# Patient Record
Sex: MALE | Race: WHITE | Employment: FULL TIME | URBAN - METROPOLITAN AREA
[De-identification: names, ages, dates, MRNs, and addresses within clinical notes are randomized per-mention and may not be internally consistent; named-entity substitution may affect disease eponyms.]

---

## 2018-09-16 ENCOUNTER — HOSPITAL ENCOUNTER (EMERGENCY)
Age: 38
Discharge: HOME | End: 2018-09-16
Attending: EMERGENCY MEDICINE
Payer: MEDICARE

## 2018-09-16 VITALS
TEMPERATURE: 98.5 F | WEIGHT: 170 LBS | HEART RATE: 112 BPM | RESPIRATION RATE: 15 BRPM | DIASTOLIC BLOOD PRESSURE: 94 MMHG | OXYGEN SATURATION: 95 % | SYSTOLIC BLOOD PRESSURE: 138 MMHG

## 2018-09-16 DIAGNOSIS — Z00.8 MEDICAL CLEARANCE FOR INCARCERATION: Primary | ICD-10-CM

## 2018-09-16 LAB
EKG ATRIAL RATE: 113 BPM
EKG P AXIS: 47 DEGREES
EKG P-R INTERVAL: 124 MS
EKG Q-T INTERVAL: 320 MS
EKG QRS DURATION: 82 MS
EKG QTC CALCULATION (BAZETT): 438 MS
EKG R AXIS: 57 DEGREES
EKG T AXIS: 47 DEGREES
EKG VENTRICULAR RATE: 113 BPM

## 2018-09-16 PROCEDURE — 90471 IMMUNIZATION ADMIN: CPT | Performed by: EMERGENCY MEDICINE

## 2018-09-16 PROCEDURE — 6360000002 HC RX W HCPCS: Performed by: EMERGENCY MEDICINE

## 2018-09-16 PROCEDURE — 99283 EMERGENCY DEPT VISIT LOW MDM: CPT

## 2018-09-16 PROCEDURE — 90715 TDAP VACCINE 7 YRS/> IM: CPT | Performed by: EMERGENCY MEDICINE

## 2018-09-16 PROCEDURE — 93005 ELECTROCARDIOGRAM TRACING: CPT

## 2018-09-16 RX ADMIN — TETANUS TOXOID, REDUCED DIPHTHERIA TOXOID AND ACELLULAR PERTUSSIS VACCINE, ADSORBED 0.5 ML: 5; 2.5; 8; 8; 2.5 SUSPENSION INTRAMUSCULAR at 22:24

## 2018-09-17 ASSESSMENT — ENCOUNTER SYMPTOMS
RHINORRHEA: 0
VOMITING: 0
COLOR CHANGE: 0
SHORTNESS OF BREATH: 0
ABDOMINAL PAIN: 0
COUGH: 0
EYE PAIN: 0
NAUSEA: 0
SORE THROAT: 0
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
## 2022-02-07 ENCOUNTER — APPOINTMENT (OUTPATIENT)
Dept: GENERAL RADIOLOGY | Age: 42
End: 2022-02-07
Payer: MEDICARE

## 2022-02-07 ENCOUNTER — HOSPITAL ENCOUNTER (EMERGENCY)
Age: 42
Discharge: HOME | End: 2022-02-07
Attending: EMERGENCY MEDICINE
Payer: MEDICARE

## 2022-02-07 VITALS
DIASTOLIC BLOOD PRESSURE: 88 MMHG | HEIGHT: 69 IN | SYSTOLIC BLOOD PRESSURE: 114 MMHG | HEART RATE: 74 BPM | TEMPERATURE: 97.2 F | BODY MASS INDEX: 23.7 KG/M2 | RESPIRATION RATE: 18 BRPM | WEIGHT: 160 LBS | OXYGEN SATURATION: 98 %
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
## 2022-02-07 DIAGNOSIS — S46.911A STRAIN OF RIGHT SHOULDER, INITIAL ENCOUNTER: ICD-10-CM

## 2022-02-07 DIAGNOSIS — S23.9XXA THORACIC SPRAIN: ICD-10-CM

## 2022-02-07 PROCEDURE — 73030 X-RAY EXAM OF SHOULDER: CPT

## 2022-02-07 PROCEDURE — 99285 EMERGENCY DEPT VISIT HI MDM: CPT

## 2022-02-07 PROCEDURE — 72072 X-RAY EXAM THORAC SPINE 3VWS: CPT

## 2022-02-07 RX ORDER — CYCLOBENZAPRINE HCL 10 MG
10 TABLET ORAL NIGHTLY PRN
Qty: 10 TABLET | Refills: 0 | Status: SHIPPED | OUTPATIENT
Start: 2022-02-07

## 2022-02-07 ASSESSMENT — PAIN DESCRIPTION - PAIN TYPE: TYPE: ACUTE PAIN

## 2022-02-07 ASSESSMENT — PAIN DESCRIPTION - LOCATION: LOCATION: SHOULDER

## 2022-02-07 ASSESSMENT — PAIN DESCRIPTION - FREQUENCY: FREQUENCY: CONTINUOUS

## 2022-02-07 ASSESSMENT — PAIN SCALES - GENERAL: PAINLEVEL_OUTOF10: 9

## 2022-02-07 ASSESSMENT — PAIN DESCRIPTION - DESCRIPTORS: DESCRIPTORS: DISCOMFORT

## 2022-02-07 ASSESSMENT — PAIN DESCRIPTION - ORIENTATION: ORIENTATION: RIGHT
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
## 2022-02-07 NOTE — Clinical Note
Ashley Garcia was seen and treated in our emergency department on 2/7/2022. He may return to work on 02/08/2022. If you have any questions or concerns, please don't hesitate to call.       Ernst Cuevas PA-C

## 2022-02-07 NOTE — ED NOTES
Discharge instructions reviewed with patient, noting prescription for Flexeril and need to set-up appointment with family provider for follow-up. Patient gathered all personal items and transferred off unit to lobby under own power without incident.       Gricel Acuna RN  02/07/22 0623

## 2023-03-06 ENCOUNTER — HOSPITAL ENCOUNTER (EMERGENCY)
Age: 43
Discharge: HOME | End: 2023-03-06
Attending: STUDENT IN AN ORGANIZED HEALTH CARE EDUCATION/TRAINING PROGRAM

## 2023-03-06 VITALS
HEIGHT: 69 IN | DIASTOLIC BLOOD PRESSURE: 74 MMHG | SYSTOLIC BLOOD PRESSURE: 111 MMHG | RESPIRATION RATE: 15 BRPM | BODY MASS INDEX: 23.11 KG/M2 | HEART RATE: 90 BPM | OXYGEN SATURATION: 96 % | TEMPERATURE: 98.7 F | WEIGHT: 156 LBS

## 2023-03-06 DIAGNOSIS — U07.1 COVID-19: Primary | ICD-10-CM

## 2023-03-06 LAB
FLUAV RNA RESP QL NAA+PROBE: NOT DETECTED
FLUBV RNA RESP QL NAA+PROBE: NOT DETECTED
SARS-COV-2 RNA RESP QL NAA+PROBE: DETECTED
SOURCE: ABNORMAL
SPECIMEN DESCRIPTION: ABNORMAL

## 2023-03-06 PROCEDURE — 99283 EMERGENCY DEPT VISIT LOW MDM: CPT

## 2023-03-06 PROCEDURE — 87636 SARSCOV2 & INF A&B AMP PRB: CPT

## 2023-03-06 RX ORDER — IBUPROFEN 600 MG/1
600 TABLET ORAL EVERY 8 HOURS PRN
Qty: 15 TABLET | Refills: 0 | Status: SHIPPED | OUTPATIENT
Start: 2023-03-06 | End: 2023-03-11

## 2023-03-06 RX ORDER — ACETAMINOPHEN 500 MG
1000 TABLET ORAL 3 TIMES DAILY
Qty: 42 TABLET | Refills: 0 | Status: SHIPPED | OUTPATIENT
Start: 2023-03-06 | End: 2023-03-13

## 2023-03-06 ASSESSMENT — ENCOUNTER SYMPTOMS
ABDOMINAL PAIN: 0
COUGH: 0
SHORTNESS OF BREATH: 0
NAUSEA: 0
RHINORRHEA: 0
VOMITING: 0

## 2023-03-06 ASSESSMENT — LIFESTYLE VARIABLES
HOW OFTEN DO YOU HAVE A DRINK CONTAINING ALCOHOL: NEVER
HOW MANY STANDARD DRINKS CONTAINING ALCOHOL DO YOU HAVE ON A TYPICAL DAY: PATIENT DOES NOT DRINK

## 2023-03-06 NOTE — ED PROVIDER NOTES
[facility]  Emergency Department Encounter  Emergency Medicine Physician     Pt Name: Khushi Dejesus  MRN: [id number]  [place] 1980  Date of evaluation: 3/6/23  PCP:  No primary care provider on file. CHIEF COMPLAINT       Chief Complaint   Patient presents with    Cough    Nasal Congestion       HISTORY OF PRESENT ILLNESS  (Location/Symptom, Timing/Onset, Context/Setting, Quality, Duration, Modifying Factors, Severity.)    Khushi Dejesus is a 38 y.o. male who presents with chills, body aches, cough. Symptoms worsening over the past few days. He states his girlfriend has similar symptoms. He states that he did not sleep very well last night. He states he has been taking Motrin along with Sharif back and body. Denies any nausea or vomiting. Was able to eat [employer] this morning although he stated that \"had no flavor\". Denies any abdominal pain chest pain or shortness of breath. PAST MEDICAL / SURGICAL / SOCIAL / FAMILY HISTORY    has no past medical history on file. has a past surgical history that includes Testicle surgery.     Social History     Socioeconomic History    Marital status:      Spouse name: Not on file    Number of children: Not on file    Years of education: Not on file    Highest education level: Not on file   Occupational History    Not on file   Tobacco Use    Smoking status: Every Day     Packs/day: 1.00     Types: Cigarettes    Smokeless tobacco: Never   Substance and Sexual Activity    Alcohol use: No    Drug use: No    Sexual activity: Not on file   Other Topics Concern    Not on file   Social History Narrative    Not on file     Social Determinants of Health     Financial Resource Strain: Not on file   Food Insecurity: Not on file   Transportation Needs: Not on file   Physical Activity: Not on file   Stress: Not on file   Social Connections: Not on file   Intimate Partner Violence: Not on file   Housing Stability: Not on file       No family history on file. Allergies:    Pcn [penicillins]    Home Medications:  Prior to Admission medications    Medication Sig Start Date End Date Taking? Authorizing Provider   acetaminophen (TYLENOL) 500 MG tablet Take 2 tablets by mouth 3 times daily for 7 days 3/6/23 3/13/23 Yes Jose Silva DO   ibuprofen (IBU) 600 MG tablet Take 1 tablet by mouth every 8 hours as needed for Pain 3/6/23 3/11/23 Yes Jose Silva DO       REVIEW OF SYSTEMS    (2-9 systems for level 4, 10 or more for level 5)    Review of Systems   Constitutional:  Positive for chills and fatigue. Negative for fever. HENT:  Negative for congestion and rhinorrhea. Respiratory:  Negative for cough and shortness of breath. Cardiovascular:  Negative for chest pain. Gastrointestinal:  Negative for abdominal pain, nausea and vomiting. Musculoskeletal:  Positive for myalgias. Neurological:  Negative for light-headedness and headaches. All other systems reviewed and are negative. PHYSICAL EXAM   (up to 7 for level 4, 8 or more for level 5)    INITIAL VITALS:   ED Triage Vitals [03/06/23 0641]   BP Temp Temp Source Heart Rate Resp SpO2 Height Weight   105/77 98.7 °F (37.1 °C) Oral 87 20 98 % 5' 9\" (1.753 m) 156 lb (70.8 kg)       Physical Exam  Vitals and nursing note reviewed. Constitutional:       General: He is not in acute distress. Appearance: Normal appearance. Cardiovascular:      Rate and Rhythm: Normal rate and regular rhythm. Pulses: Normal pulses. Radial pulses are 2+ on the right side and 2+ on the left side. Heart sounds: Normal heart sounds. No murmur heard. Pulmonary:      Effort: Pulmonary effort is normal. No respiratory distress. Breath sounds: Normal breath sounds. No decreased breath sounds, wheezing or rhonchi. Abdominal:      Palpations: Abdomen is soft. Tenderness: There is no abdominal tenderness. There is left CVA tenderness. There is no right CVA tenderness. Skin:     General: Skin is warm and dry. Capillary Refill: Capillary refill takes less than 2 seconds. Neurological:      General: No focal deficit present. Mental Status: He is alert and oriented to person, place, and time. DIFFERENTIAL  DIAGNOSIS   PLAN (LABS / IMAGING / EKG):  Orders Placed This Encounter   Procedures    COVID-19 & Influenza Combo       MEDICATIONS ORDERED:  Orders Placed This Encounter   Medications    acetaminophen (TYLENOL) 500 MG tablet     Sig: Take 2 tablets by mouth 3 times daily for 7 days     Dispense:  42 tablet     Refill:  0    ibuprofen (IBU) 600 MG tablet     Sig: Take 1 tablet by mouth every 8 hours as needed for Pain     Dispense:  15 tablet     Refill:  0         DIAGNOSTIC RESULTS / EMERGENCYDEPARTMENT COURSE / MDM   LABS:  Labs Reviewed   COVID-19 & INFLUENZA COMBO - Abnormal; Notable for the following components:       Result Value    SARS-CoV-2 RNA, RT PCR DETECTED (*)     All other components within normal limits       RADIOLOGY:  No results found. Impression:  1)  Number and Complexity of Problems    Problem List, DDX, Considered diagnosis:  COVID, influenza, viral syndrome    High concern for COVID, will swab for COVID and influenza. Mild symptoms otherwise, vital signs stable. Pertinent Comorbid Conditions:  See history above. EMERGENCY DEPARTMENT COURSE:  MDM:    2)  Data Reviewed    Decision Rules, Testing considered, external document review, independent imaging review:    Consider chest x-ray but with mild symptoms, I do not feel it is necessary at this time.   Patient took [place] just before arrival.  The bare he takes is a mixture of aspirin and acetaminophen      3)  Treatment and Disposition     ED Course as of 03/06/23 0817   Mon Mar 06, 2023   0731 SARS-CoV-2 RNA, RT PCR(!): DETECTED [AP]      ED Course User Index  [AP] Taurus Bautista, DO       Patient repeat assessment, shared decision making, and disposition discussion: Reassessment patient feels about the same. He is COVID-positive. Will plan for isolation precautions, symptomatic treatment, and work note. Given the fact the patient is having relatively mild symptoms I do not feel that he is a candidate for Paxlovid therapy    MIPS:  [None]    Social determinants of health impacting treatment or disposition: Does not have a primary care provider but can fill prescriptions    Code Status Discussion:  Did not have Code status discussion since patient being discharged      PROCEDURES:  None    CONSULTS:  None    CRITICAL CARE:  There was a high probability of clinically significant/life threatening deterioration in this patient's condition which required my urgent intervention. Total critical care time was 10 minutes. This excludes any time for separately reportable procedures. FINAL IMPRESSION     1. COVID-19          DISPOSITION / PLAN   DISPOSITION Decision To Discharge 03/06/2023 07:33:13 AM      Evaluation and treatment course in the ED, and plan of care upon discharge was discussed in length with the patient/patient representative. Patient/patient representative had no further questions prior to being discharged and was instructed to return to the ED for new or worsening symptoms. Any changes to existing medications or new prescriptions were reviewed with patient/patient representative and they expressed understanding of how to correctly take their medications and the possible side effects.     PATIENT REFERRED TO:  JIM CAMP [facility] [address]  [address] [place] [address] [address] [address]  Schedule an appointment as soon as possible for a visit   To establish care    DISCHARGE MEDICATIONS:  Discharge Medication List as of 3/6/2023  7:37 AM        START taking these medications    Details   acetaminophen (TYLENOL) 500 MG tablet Take 2 tablets by mouth 3 times daily for 7 days, Disp-42 tablet, R-0Print      ibuprofen (IBU) 600 MG tablet Take 1 tablet by mouth every 8 hours as needed for Pain, Disp-15 tablet, R-0Print             Beth Daniels DO  Emergency Medicine Attending    (Please note that portions of this note were completed with a voice recognition program.  Efforts were made to edit the dictations but occasionally words are mis-transcribed.)          Beth Daniels DO  03/06/23 0818

## 2023-03-06 NOTE — DISCHARGE INSTRUCTIONS
Today tested positive for COVID-19  I would recommend continue to isolate yourself for at least the next 5 days.   I recommend wearing a mask at all times unless you are by yourself or outside  Please try to limit the amount of time you spend with other people  You may take the Tylenol and Motrin as needed as prescribed  Should you begin having any shortness of breath, high fevers, chills, difficulty breathing, or any other severe concerning symptoms or unresolving symptoms, please return to emergency room immediately Statement Selected

## 2023-03-06 NOTE — ED TRIAGE NOTES
Mode of arrival (squad #, walk in, police, etc) : walk In      Chief complaint(s): Cough , Sob . Runny Nose     Arrival Note (brief scenario, treatment PTA, etc). : came with complains of  cough , runny nose and  feeling hot and warm since last  night. ,  Pt think he might have COVID . C= \"Have you ever felt that you should Cut down on your drinking? \"  No  A= \"Have people Annoyed you by criticizing your drinking? \"  No  G= \"Have you ever felt bad or Guilty about your drinking? \"  No  E= \"Have you ever had a drink as an Eye-opener first thing in the morning to steady your nerves or to help a hangover? \"  No      Deferred []    Reason for deferring: N/A    *If yes to two or more: probable alcohol abuse. *

## 2023-03-06 NOTE — Clinical Note
Nahum Rock was seen and treated in our emergency department on 3/6/2023. He may return to work on 03/13/2023. Due to COVID-19 infection, patient safe to return to work on 3/13/2023     If you have any questions or concerns, please don't hesitate to call.       Kaela Lane, DO

## 2024-03-23 ENCOUNTER — HOSPITAL ENCOUNTER (EMERGENCY)
Age: 44
Discharge: HOME | End: 2024-03-23
Attending: EMERGENCY MEDICINE

## 2024-03-23 ENCOUNTER — APPOINTMENT (OUTPATIENT)
Dept: CT IMAGING | Age: 44
End: 2024-03-23

## 2024-03-23 ENCOUNTER — APPOINTMENT (OUTPATIENT)
Dept: GENERAL RADIOLOGY | Age: 44
End: 2024-03-23
Attending: EMERGENCY MEDICINE

## 2024-03-23 VITALS
BODY MASS INDEX: 24.44 KG/M2 | TEMPERATURE: 98.4 F | HEIGHT: 69 IN | WEIGHT: 165 LBS | SYSTOLIC BLOOD PRESSURE: 114 MMHG | DIASTOLIC BLOOD PRESSURE: 87 MMHG | HEART RATE: 95 BPM

## 2024-03-23 DIAGNOSIS — J06.9 UPPER RESPIRATORY TRACT INFECTION, UNSPECIFIED TYPE: Primary | ICD-10-CM

## 2024-03-23 DIAGNOSIS — H53.40 VISUAL FIELD DEFECTS: ICD-10-CM

## 2024-03-23 LAB
ANION GAP SERPL CALCULATED.3IONS-SCNC: 10 MMOL/L (ref 9–17)
BASOPHILS # BLD: 0.1 K/UL (ref 0–0.2)
BASOPHILS NFR BLD: 1 % (ref 0–2)
BUN SERPL-MCNC: 15 MG/DL (ref 6–20)
CALCIUM SERPL-MCNC: 9 MG/DL (ref 8.6–10.4)
CHLORIDE SERPL-SCNC: 105 MMOL/L (ref 98–107)
CK SERPL-CCNC: 107 U/L (ref 39–308)
CO2 SERPL-SCNC: 24 MMOL/L (ref 20–31)
CREAT SERPL-MCNC: 0.8 MG/DL (ref 0.7–1.2)
EOSINOPHIL # BLD: 0.3 K/UL (ref 0–0.4)
EOSINOPHILS RELATIVE PERCENT: 2 % (ref 0–4)
ERYTHROCYTE [DISTWIDTH] IN BLOOD BY AUTOMATED COUNT: 13.9 % (ref 11.5–14.9)
GFR SERPL CREATININE-BSD FRML MDRD: >60 ML/MIN/1.73M2
GLUCOSE SERPL-MCNC: 88 MG/DL (ref 70–99)
HCT VFR BLD AUTO: 45.4 % (ref 41–53)
HGB BLD-MCNC: 15.2 G/DL (ref 13.5–17.5)
LYMPHOCYTES NFR BLD: 2.2 K/UL (ref 1–4.8)
LYMPHOCYTES RELATIVE PERCENT: 20 % (ref 24–44)
MAGNESIUM SERPL-MCNC: 2.2 MG/DL (ref 1.6–2.6)
MCH RBC QN AUTO: 32.7 PG (ref 26–34)
MCHC RBC AUTO-ENTMCNC: 33.5 G/DL (ref 31–37)
MCV RBC AUTO: 97.7 FL (ref 80–100)
MONOCYTES NFR BLD: 0.7 K/UL (ref 0.1–1.3)
MONOCYTES NFR BLD: 7 % (ref 1–7)
NEUTROPHILS NFR BLD: 70 % (ref 36–66)
NEUTS SEG NFR BLD: 7.5 K/UL (ref 1.3–9.1)
PLATELET # BLD AUTO: 313 K/UL (ref 150–450)
PMV BLD AUTO: 8 FL (ref 6–12)
POTASSIUM SERPL-SCNC: 4.2 MMOL/L (ref 3.7–5.3)
RBC # BLD AUTO: 4.65 M/UL (ref 4.5–5.9)
SODIUM SERPL-SCNC: 139 MMOL/L (ref 135–144)
WBC OTHER # BLD: 10.7 K/UL (ref 3.5–11)

## 2024-03-23 PROCEDURE — 85025 COMPLETE CBC W/AUTO DIFF WBC: CPT

## 2024-03-23 PROCEDURE — 2580000003 HC RX 258: Performed by: EMERGENCY MEDICINE

## 2024-03-23 PROCEDURE — 70450 CT HEAD/BRAIN W/O DYE: CPT

## 2024-03-23 PROCEDURE — 83735 ASSAY OF MAGNESIUM: CPT

## 2024-03-23 PROCEDURE — 71045 X-RAY EXAM CHEST 1 VIEW: CPT

## 2024-03-23 PROCEDURE — 82550 ASSAY OF CK (CPK): CPT

## 2024-03-23 PROCEDURE — 36415 COLL VENOUS BLD VENIPUNCTURE: CPT

## 2024-03-23 PROCEDURE — 99284 EMERGENCY DEPT VISIT MOD MDM: CPT

## 2024-03-23 PROCEDURE — 80048 BASIC METABOLIC PNL TOTAL CA: CPT

## 2024-03-23 RX ORDER — 0.9 % SODIUM CHLORIDE 0.9 %
1000 INTRAVENOUS SOLUTION INTRAVENOUS ONCE
Status: COMPLETED | OUTPATIENT
Start: 2024-03-23 | End: 2024-03-23

## 2024-03-23 RX ORDER — BENZONATATE 100 MG/1
100 CAPSULE ORAL 3 TIMES DAILY PRN
Qty: 20 CAPSULE | Refills: 0 | Status: SHIPPED | OUTPATIENT
Start: 2024-03-23 | End: 2024-03-30

## 2024-03-23 RX ADMIN — SODIUM CHLORIDE 1000 ML: 9 INJECTION, SOLUTION INTRAVENOUS at 14:10

## 2024-03-23 ASSESSMENT — ENCOUNTER SYMPTOMS
FACIAL SWELLING: 0
COLOR CHANGE: 0
VOMITING: 0
EYE PAIN: 0
SINUS PRESSURE: 0
CONSTIPATION: 0
RHINORRHEA: 0
CHEST TIGHTNESS: 0
EYE DISCHARGE: 0
BACK PAIN: 0
BLOOD IN STOOL: 0
COUGH: 1
EYE REDNESS: 0
SORE THROAT: 0
SHORTNESS OF BREATH: 0
WHEEZING: 0
TROUBLE SWALLOWING: 0
DIARRHEA: 0
ABDOMINAL PAIN: 0

## 2024-03-23 ASSESSMENT — PAIN - FUNCTIONAL ASSESSMENT: PAIN_FUNCTIONAL_ASSESSMENT: 0-10

## 2024-03-23 ASSESSMENT — PAIN SCALES - GENERAL: PAINLEVEL_OUTOF10: 4

## 2024-03-23 ASSESSMENT — PAIN DESCRIPTION - LOCATION: LOCATION: GENERALIZED

## 2024-03-23 ASSESSMENT — LIFESTYLE VARIABLES
HOW MANY STANDARD DRINKS CONTAINING ALCOHOL DO YOU HAVE ON A TYPICAL DAY: PATIENT DOES NOT DRINK
HOW OFTEN DO YOU HAVE A DRINK CONTAINING ALCOHOL: NEVER

## 2024-03-23 NOTE — ED TRIAGE NOTES
Mode of arrival (squad #, walk in, police, etc) : walk-in        Chief complaint(s): headache, cough, body aches,, gait problem        Arrival Note (brief scenario, treatment PTA, etc).: patient states for about a week and half, gait/vision problem, headache, cough, nasal congestion and bodyaches         C= \"Have you ever felt that you should Cut down on your drinking?\"  No  A= \"Have people Annoyed you by criticizing your drinking?\"  No  G= \"Have you ever felt bad or Guilty about your drinking?\"  No  E= \"Have you ever had a drink as an Eye-opener first thing in the morning to steady your nerves or to help a hangover?\"  No      Deferred []      Reason for deferring: N/A    *If yes to two or more: probable alcohol abuse.*

## 2024-03-23 NOTE — ED PROVIDER NOTES
[facility]  eMERGENCY dEPARTMENT eNCOUnter      Pt Name: Philippe Aggarwal  MRN: [id number]  Birthdate 1980  Date of evaluation: 3/23/24      CHIEF COMPLAINT       Chief Complaint   Patient presents with    Cough    Nasal Congestion    Generalized Body Aches    Headache    Gait Problem         HISTORY OF PRESENT ILLNESS    Philippe Aggarwal is a 43 y.o. male who presents complaining of multiple complaints.  Patient states about 2-1/2 weeks ago he started having a cough nonproductive sputum with a lot of congestion and just body aches.  Patient states his left eye he started having issues with vision.  Patient states that most the time he can see it what he is looking at but then he has a lot of peripheral vision and blindness.  Patient states that he gets blurry and then just loses things.  Patient states sometimes even the central vision gets really blurry when he is looking.  Patient states he gets real dizzy when he stands up and sometimes when he is walking he cannot veers to the left according to his significant other.  Patient has no trauma to the head.  Patient has no numbness tingling or weakness anywhere else.  Patient has had no speech issues.  Patient denies vomiting or diarrhea.      REVIEW OF SYSTEMS       Review of Systems   Constitutional:  Negative for activity change, appetite change, chills, diaphoresis and fever.   HENT:  Negative for congestion, ear pain, facial swelling, nosebleeds, rhinorrhea, sinus pressure, sore throat and trouble swallowing.    Eyes:  Positive for visual disturbance. Negative for pain, discharge and redness.   Respiratory:  Positive for cough. Negative for chest tightness, shortness of breath and wheezing.    Cardiovascular:  Negative for chest pain, palpitations and leg swelling.   Gastrointestinal:  Negative for abdominal pain, blood in stool, constipation, diarrhea, nausea and vomiting.   Genitourinary:  Negative for difficulty urinating, dysuria, flank pain, frequency, genital  03/23/24 1519   Sat Mar 23, 2024   1517 Discussed the case with Dr. Mayfield ophthalmologist who feels the patient will need a more thorough retinal exam and recommends following up with them first thing Monday morning.  He also recommends no physical activity to where he would be jostling his head around or potentially getting hit in the head.  Patient was updated on this and we will give him follow-up information. [JL]      ED Course User Index  [JL] Roddy Caraballo MD         PROCEDURES:  None      DATA FOR LAB AND RADIOLOGY TESTS ORDERED BELOW ARE REVIEWED BY THE ED CLINICIAN:    RADIOLOGY: All x-rays, CT, MRI, and formal ultrasound images (except ED bedside ultrasound) are read by the radiologist, see reports below, unless otherwise noted in MDM or here.  Reports below are reviewed by myself.  CT HEAD WO CONTRAST   Final Result   No acute intracranial abnormalities are noted.         XR CHEST PORTABLE   Final Result   No significant abnormalities detected.             LABS: Lab orders shown below, the results are reviewed by myself, and all abnormals are listed below.  Labs Reviewed   CBC WITH AUTO DIFFERENTIAL - Abnormal; Notable for the following components:       Result Value    Neutrophils % 70 (*)     Lymphocytes % 20 (*)     All other components within normal limits   BASIC METABOLIC PANEL   MAGNESIUM   CK       Vitals Reviewed:    Vitals:    03/23/24 1342   BP: 114/87   Pulse: 95   Temp: 98.4 °F (36.9 °C)   TempSrc: Oral   Weight: 74.8 kg (165 lb)   Height: 1.753 m (5' 9\")     MEDICATIONS GIVEN TO PATIENT THIS ENCOUNTER:  Orders Placed This Encounter   Medications    sodium chloride 0.9 % bolus 1,000 mL    benzonatate (TESSALON) 100 MG capsule     Sig: Take 1 capsule by mouth 3 times daily as needed for Cough     Dispense:  20 capsule     Refill:  0     DISCHARGE PRESCRIPTIONS:  New Prescriptions    BENZONATATE (TESSALON) 100 MG CAPSULE    Take 1 capsule by mouth 3 times daily as needed for Cough

## 2024-09-10 ENCOUNTER — HOSPITAL ENCOUNTER (EMERGENCY)
Age: 44
Discharge: HOME | End: 2024-09-10
Attending: EMERGENCY MEDICINE

## 2024-09-10 VITALS
BODY MASS INDEX: 23.64 KG/M2 | HEIGHT: 68 IN | HEART RATE: 99 BPM | RESPIRATION RATE: 17 BRPM | DIASTOLIC BLOOD PRESSURE: 96 MMHG | WEIGHT: 156 LBS | TEMPERATURE: 98.6 F | OXYGEN SATURATION: 97 % | SYSTOLIC BLOOD PRESSURE: 114 MMHG

## 2024-09-10 DIAGNOSIS — J06.9 VIRAL UPPER RESPIRATORY TRACT INFECTION: Primary | ICD-10-CM

## 2024-09-10 LAB
FLUAV RNA RESP QL NAA+PROBE: NOT DETECTED
FLUBV RNA RESP QL NAA+PROBE: NOT DETECTED
SARS-COV-2 RNA RESP QL NAA+PROBE: NOT DETECTED
SOURCE: NORMAL
SPECIMEN DESCRIPTION: NORMAL

## 2024-09-10 PROCEDURE — 99283 EMERGENCY DEPT VISIT LOW MDM: CPT

## 2024-09-10 PROCEDURE — 87636 SARSCOV2 & INF A&B AMP PRB: CPT

## 2024-09-10 RX ORDER — ALBUTEROL SULFATE 90 UG/1
2 AEROSOL, METERED RESPIRATORY (INHALATION) 4 TIMES DAILY PRN
Qty: 18 G | Refills: 0 | Status: SHIPPED | OUTPATIENT
Start: 2024-09-10

## 2024-09-10 ASSESSMENT — PAIN SCALES - GENERAL: PAINLEVEL_OUTOF10: 3

## 2024-09-10 ASSESSMENT — PAIN - FUNCTIONAL ASSESSMENT: PAIN_FUNCTIONAL_ASSESSMENT: 0-10
